# Patient Record
Sex: FEMALE | URBAN - METROPOLITAN AREA
[De-identification: names, ages, dates, MRNs, and addresses within clinical notes are randomized per-mention and may not be internally consistent; named-entity substitution may affect disease eponyms.]

---

## 2022-11-08 ENCOUNTER — OFFICE (OUTPATIENT)
Dept: URBAN - METROPOLITAN AREA CLINIC 29 | Facility: CLINIC | Age: 67
Setting detail: OPHTHALMOLOGY
End: 2022-11-08
Payer: COMMERCIAL

## 2022-11-08 DIAGNOSIS — H18.002: ICD-10-CM

## 2022-11-08 PROCEDURE — 99213 OFFICE O/P EST LOW 20 MIN: CPT | Performed by: OPHTHALMOLOGY

## 2022-11-08 ASSESSMENT — CONFRONTATIONAL VISUAL FIELD TEST (CVF)
OS_FINDINGS: FULL
OD_FINDINGS: FULL

## 2022-11-08 ASSESSMENT — REFRACTION_MANIFEST
OD_VA1: 20/20
OD_ADD: +2.50
OS_AXIS: 085
OD_SPHERE: +2.25
OD_CYLINDER: SPHERE
OS_VA1: 20/20
OS_SPHERE: +1.00
OS_ADD: +2.50
OS_CYLINDER: +0.50

## 2022-11-08 ASSESSMENT — REFRACTION_CURRENTRX
OS_CYLINDER: +0.50
OD_SPHERE: +2.50
OS_AXIS: 85
OD_VPRISM_DIRECTION: PROGS
OS_ADD: +2.50
OS_VPRISM_DIRECTION: PROGS
OD_CYLINDER: SPH
OD_ADD: +2.50
OS_SPHERE: +1.25
OD_OVR_VA: 20/
OS_OVR_VA: 20/

## 2022-11-08 ASSESSMENT — AXIALLENGTH_DERIVED
OD_AL: 22.0035
OS_AL: 22.3865
OS_AL: 22.4304

## 2022-11-08 ASSESSMENT — SPHEQUIV_DERIVED
OS_SPHEQUIV: 1.375
OD_SPHEQUIV: 2.25
OS_SPHEQUIV: 1.25

## 2022-11-08 ASSESSMENT — TEAR BREAK UP TIME (TBUT)
OS_TBUT: T
OD_TBUT: T

## 2022-11-08 ASSESSMENT — KERATOMETRY
OD_K2POWER_DIOPTERS: 46.00
OS_AXISANGLE_DEGREES: 77
OD_AXISANGLE_DEGREES: 102
OD_K1POWER_DIOPTERS: 45.50
OS_K2POWER_DIOPTERS: 46.00
OS_K1POWER_DIOPTERS: 45.00

## 2022-11-08 ASSESSMENT — TONOMETRY
OS_IOP_MMHG: 18
OD_IOP_MMHG: 17

## 2022-11-08 ASSESSMENT — REFRACTION_AUTOREFRACTION
OS_SPHERE: +1.00
OS_AXIS: 75
OD_SPHERE: +2.00
OD_CYLINDER: +0.50
OD_AXIS: 110
OS_CYLINDER: +0.75

## 2022-11-08 ASSESSMENT — VISUAL ACUITY
OD_BCVA: 20/80
OS_BCVA: 20/25-

## 2022-12-02 ENCOUNTER — OFFICE (OUTPATIENT)
Dept: URBAN - METROPOLITAN AREA CLINIC 29 | Facility: CLINIC | Age: 67
Setting detail: OPHTHALMOLOGY
End: 2022-12-02
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
## 2022-12-02 PROCEDURE — 10004 FNA BX W/O IMG GDN EA ADDL: CPT | Performed by: OPTOMETRIST

## 2023-02-13 ENCOUNTER — OFFICE (OUTPATIENT)
Dept: URBAN - METROPOLITAN AREA CLINIC 29 | Facility: CLINIC | Age: 68
Setting detail: OPHTHALMOLOGY
End: 2023-02-13
Payer: COMMERCIAL

## 2023-02-13 DIAGNOSIS — H43.813: ICD-10-CM

## 2023-02-13 DIAGNOSIS — H47.321: ICD-10-CM

## 2023-02-13 DIAGNOSIS — H25.13: ICD-10-CM

## 2023-02-13 DIAGNOSIS — H16.223: ICD-10-CM

## 2023-02-13 PROCEDURE — 92014 COMPRE OPH EXAM EST PT 1/>: CPT | Performed by: OPTOMETRIST

## 2023-02-13 ASSESSMENT — REFRACTION_MANIFEST
OS_VA1: 20/20
OD_SPHERE: +2.25
OD_CYLINDER: SPHERE
OS_SPHERE: +1.00
OS_AXIS: 085
OD_VA1: 20/20
OS_AXIS: 085
OD_SPHERE: +2.25
OS_SPHERE: +1.00
OS_CYLINDER: +0.50
OS_CYLINDER: +0.50
OS_ADD: +2.50
OD_VA1: 20/20
OD_CYLINDER: SPHERE
OD_ADD: +2.50
OD_ADD: +2.50
OS_VA1: 20/20
OS_ADD: +2.50

## 2023-02-13 ASSESSMENT — REFRACTION_AUTOREFRACTION
OD_SPHERE: +2.00
OD_AXIS: 115
OS_SPHERE: +1.00
OD_CYLINDER: +0.75
OS_CYLINDER: +0.75
OS_AXIS: 070

## 2023-02-13 ASSESSMENT — TEAR BREAK UP TIME (TBUT)
OS_TBUT: T
OD_TBUT: T

## 2023-02-13 ASSESSMENT — AXIALLENGTH_DERIVED
OS_AL: 22.4304
OS_AL: 22.3865
OS_AL: 22.4304
OD_AL: 21.9612

## 2023-02-13 ASSESSMENT — CONFRONTATIONAL VISUAL FIELD TEST (CVF)
OS_FINDINGS: FULL
OD_FINDINGS: FULL

## 2023-02-13 ASSESSMENT — REFRACTION_CURRENTRX
OS_OVR_VA: 20/
OS_CYLINDER: +0.50
OS_VPRISM_DIRECTION: PROGS
OD_VPRISM_DIRECTION: PROGS
OS_SPHERE: +1.25
OD_CYLINDER: SPH
OS_AXIS: 85
OD_SPHERE: +2.50
OS_ADD: +2.50
OD_OVR_VA: 20/
OD_ADD: +2.50

## 2023-02-13 ASSESSMENT — KERATOMETRY
OD_K2POWER_DIOPTERS: 46.00
OS_AXISANGLE_DEGREES: 77
OS_K2POWER_DIOPTERS: 46.00
OD_AXISANGLE_DEGREES: 102
OS_K1POWER_DIOPTERS: 45.00
OD_K1POWER_DIOPTERS: 45.50

## 2023-02-13 ASSESSMENT — SPHEQUIV_DERIVED
OD_SPHEQUIV: 2.375
OS_SPHEQUIV: 1.25
OS_SPHEQUIV: 1.375
OS_SPHEQUIV: 1.25

## 2023-02-13 ASSESSMENT — VISUAL ACUITY
OD_BCVA: 20/40
OS_BCVA: 20/40+2

## 2023-02-13 ASSESSMENT — TONOMETRY
OS_IOP_MMHG: 15
OD_IOP_MMHG: 15

## 2023-04-20 PROBLEM — Z00.00 ENCOUNTER FOR PREVENTIVE HEALTH EXAMINATION: Status: ACTIVE | Noted: 2023-04-20

## 2023-04-21 ENCOUNTER — APPOINTMENT (OUTPATIENT)
Dept: PEDIATRIC ORTHOPEDIC SURGERY | Facility: CLINIC | Age: 68
End: 2023-04-21
Payer: COMMERCIAL

## 2023-04-21 VITALS
SYSTOLIC BLOOD PRESSURE: 119 MMHG | DIASTOLIC BLOOD PRESSURE: 82 MMHG | TEMPERATURE: 96.3 F | HEIGHT: 62 IN | WEIGHT: 125 LBS | BODY MASS INDEX: 23 KG/M2

## 2023-04-21 PROCEDURE — 73562 X-RAY EXAM OF KNEE 3: CPT

## 2023-04-21 PROCEDURE — 99202 OFFICE O/P NEW SF 15 MIN: CPT

## 2023-04-21 RX ORDER — KETOROLAC TROMETHAMINE 10 MG/1
10 TABLET, FILM COATED ORAL 3 TIMES DAILY
Qty: 12 | Refills: 0 | Status: ACTIVE | COMMUNITY
Start: 2023-04-21 | End: 1900-01-01

## 2023-04-21 NOTE — ASSESSMENT
[FreeTextEntry1] : Impression: Acute sprain right knee.\par \par This patient will be treated with Toradol with GI precautions along with ice massage range of motion exercises and protected weightbearing with crutches.  She will call if she is not making significant progress over the course of a week's time.

## 2023-04-21 NOTE — PHYSICAL EXAM
[FreeTextEntry1] : Examination today reveals a obviously antalgic gait with limp on the right side.  She has a moderate to large effusion to the right knee with mild bruising along the medial aspect.  She has restricted motion of motion is from 5 degrees-75.  No obvious instability on stress of the collateral ligaments.  A Lachman appears to be negative anterior and posterior drawer could not be performed because of poor motion and spasm.  There is tenderness in the medial compartment over the MCL as well as over the medial joint line.  Popliteal fossa calf neuro vas exam are negative.\par \par X-rays ordered and taken today of the right knee reveal mild degenerative changes no fracture/loose body

## 2023-04-21 NOTE — HISTORY OF PRESENT ILLNESS
[FreeTextEntry1] : This 67-year-old healthy pleasant lady is seen for evaluation of the right knee.  This patient was well until yesterday when she tripped and fell on the stairs at home sustaining injury.  This precipitated significant pain and swelling spasm and difficulty bearing weight.  She comes in today on crutches.  Past history is noncontributory

## 2023-05-08 ENCOUNTER — APPOINTMENT (OUTPATIENT)
Dept: PEDIATRIC ORTHOPEDIC SURGERY | Facility: CLINIC | Age: 68
End: 2023-05-08
Payer: COMMERCIAL

## 2023-05-08 VITALS
DIASTOLIC BLOOD PRESSURE: 86 MMHG | BODY MASS INDEX: 23 KG/M2 | HEIGHT: 62 IN | TEMPERATURE: 96.7 F | WEIGHT: 125 LBS | SYSTOLIC BLOOD PRESSURE: 134 MMHG

## 2023-05-08 PROCEDURE — 99213 OFFICE O/P EST LOW 20 MIN: CPT | Mod: 25

## 2023-05-08 PROCEDURE — 20610 DRAIN/INJ JOINT/BURSA W/O US: CPT

## 2023-05-08 RX ORDER — DICLOFENAC SODIUM 75 MG/1
75 TABLET, DELAYED RELEASE ORAL TWICE DAILY
Qty: 60 | Refills: 1 | Status: ACTIVE | COMMUNITY
Start: 2023-05-08 | End: 1900-01-01

## 2023-05-08 NOTE — PROCEDURE
[FreeTextEntry1] : Under sterile technique with a Betadine prep the right knee was aspirated from a medial portal with an 18-gauge needle of 60 cc of blood-tinged fluid followed by injection with 40 mg of methylprednisolone and 2 cc of 1% lidocaine with Band-Aid dressing applied at the conclusion this was tolerated without incident

## 2023-05-08 NOTE — PHYSICAL EXAM
[de-identified] : On exam she has a very obvious antalgic gait with limp she has a large effusion to the knee with restricted motion 5 degrees-100 at best.  No obvious instability though she is obviously tender along the medial compartment posterior medial joint line and femoral origin of the collateral ligament though she does not open up on valgus stress.  Popliteal fossa calf neuro vas exam are negative

## 2023-05-08 NOTE — HISTORY OF PRESENT ILLNESS
[de-identified] : This 67-year-old returns for follow-up of her right knee injury.  She still is quite symptomatic with no significant improvement noted.  She is having significant difficulty traversing stairs.  She feels as if the knee will give out on her

## 2023-05-08 NOTE — ASSESSMENT
[FreeTextEntry1] : Impression: Internal derangement rule out tear medial meniscus right knee rule out occult fracture.\par \par The knee has been injected she has been placed on diclofenac with GI precautions.  She will continue with crutches minimal weightbearing.  Physical therapy has been ordered.  She will return in 3 weeks time if no improvement is noted MRI will be ordered

## 2023-06-05 ENCOUNTER — APPOINTMENT (OUTPATIENT)
Dept: PEDIATRIC ORTHOPEDIC SURGERY | Facility: CLINIC | Age: 68
End: 2023-06-05
Payer: COMMERCIAL

## 2023-06-05 VITALS
BODY MASS INDEX: 23 KG/M2 | SYSTOLIC BLOOD PRESSURE: 127 MMHG | TEMPERATURE: 96.9 F | HEIGHT: 62 IN | DIASTOLIC BLOOD PRESSURE: 85 MMHG | WEIGHT: 125 LBS

## 2023-06-05 DIAGNOSIS — M23.8X1 OTHER INTERNAL DERANGEMENTS OF RIGHT KNEE: ICD-10-CM

## 2023-06-05 PROCEDURE — 99212 OFFICE O/P EST SF 10 MIN: CPT

## 2023-06-05 NOTE — ASSESSMENT
[FreeTextEntry1] : Impression: Internal derangement right knee.\par \par She will continue with physical therapy and will return if she does not continue to progress

## 2023-06-05 NOTE — PHYSICAL EXAM
[de-identified] : On exam today she has minimal antalgic gait if any there is no significant effusion to the knee with a full range of motion and no evidence of instability much less discomfort on palpation to the medial compartment and medial joint line

## 2023-06-05 NOTE — HISTORY OF PRESENT ILLNESS
[de-identified] : This 67-year-old returns for reevaluation of her right knee since the injection and ongoing physical therapy she is definitely making a lot of improvement in her on ice "I turned the corner".  She does still however have discomfort especially going downstairs in a reciprocating fashion